# Patient Record
Sex: FEMALE | Race: WHITE | NOT HISPANIC OR LATINO | Employment: OTHER | URBAN - METROPOLITAN AREA
[De-identification: names, ages, dates, MRNs, and addresses within clinical notes are randomized per-mention and may not be internally consistent; named-entity substitution may affect disease eponyms.]

---

## 2018-01-01 ENCOUNTER — PATIENT OUTREACH (OUTPATIENT)
Dept: ADMINISTRATIVE | Facility: CLINIC | Age: 83
End: 2018-01-01

## 2018-01-04 PROBLEM — J10.1 INFLUENZA B: Status: ACTIVE | Noted: 2018-01-01

## 2018-01-04 PROBLEM — R05.9 COUGH: Status: ACTIVE | Noted: 2018-01-01

## 2018-01-06 PROBLEM — J96.01 ACUTE HYPOXEMIC RESPIRATORY FAILURE: Status: ACTIVE | Noted: 2018-01-01

## 2018-01-15 NOTE — PROGRESS NOTES
Spoke with Mariah, in case management department at [facility], pt came to skilled nursing facility and left AMA after two hours, post acute call not initiated

## 2018-07-25 PROBLEM — N18.30 CKD (CHRONIC KIDNEY DISEASE) STAGE 3, GFR 30-59 ML/MIN: Status: ACTIVE | Noted: 2018-01-01

## 2018-07-25 PROBLEM — E43 SEVERE PROTEIN-CALORIE MALNUTRITION: Status: ACTIVE | Noted: 2018-01-01

## 2018-07-25 PROBLEM — S72.101A CLOSED FRACTURE OF TROCHANTER OF RIGHT FEMUR: Status: ACTIVE | Noted: 2018-01-01

## 2018-07-26 PROBLEM — T68.XXXA HYPOTHERMIA: Status: ACTIVE | Noted: 2018-01-01

## 2018-07-26 PROBLEM — D62 ACUTE BLOOD LOSS ANEMIA: Status: ACTIVE | Noted: 2018-01-01

## 2018-07-27 PROBLEM — T68.XXXA HYPOTHERMIA: Status: RESOLVED | Noted: 2018-01-01 | Resolved: 2018-01-01

## 2018-07-27 PROBLEM — Z75.8 DISCHARGE PLANNING ISSUES: Status: ACTIVE | Noted: 2018-01-01

## 2018-08-17 PROBLEM — G93.40 ENCEPHALOPATHY: Status: ACTIVE | Noted: 2018-01-01

## 2018-08-17 PROBLEM — R79.89 ELEVATED TROPONIN: Status: ACTIVE | Noted: 2018-01-01

## 2018-08-19 PROBLEM — Z51.5 PALLIATIVE CARE ENCOUNTER: Status: ACTIVE | Noted: 2018-01-01

## 2018-08-19 PROBLEM — E87.8 ELECTROLYTE ABNORMALITY: Status: ACTIVE | Noted: 2018-01-01

## 2018-08-20 PROBLEM — F05 SUNDOWNING: Status: ACTIVE | Noted: 2018-01-01
